# Patient Record
Sex: FEMALE | Race: WHITE
[De-identification: names, ages, dates, MRNs, and addresses within clinical notes are randomized per-mention and may not be internally consistent; named-entity substitution may affect disease eponyms.]

---

## 2017-03-29 ENCOUNTER — APPOINTMENT (OUTPATIENT)
Dept: ORTHOPEDIC SURGERY | Facility: CLINIC | Age: 77
End: 2017-03-29

## 2017-03-29 VITALS — RESPIRATION RATE: 16 BRPM | WEIGHT: 175 LBS | BODY MASS INDEX: 31.01 KG/M2 | HEIGHT: 63 IN

## 2017-03-29 DIAGNOSIS — Z78.9 OTHER SPECIFIED HEALTH STATUS: ICD-10-CM

## 2017-03-29 DIAGNOSIS — M19.042 PRIMARY OSTEOARTHRITIS, LEFT HAND: ICD-10-CM

## 2017-03-29 PROBLEM — Z00.00 ENCOUNTER FOR PREVENTIVE HEALTH EXAMINATION: Status: ACTIVE | Noted: 2017-03-29

## 2017-03-29 RX ORDER — CHROMIUM 200 MCG
1000 TABLET ORAL
Refills: 0 | Status: ACTIVE | COMMUNITY

## 2017-03-29 RX ORDER — MELOXICAM 15 MG/1
15 TABLET ORAL
Qty: 14 | Refills: 0 | Status: ACTIVE | COMMUNITY
Start: 2016-12-05

## 2017-03-29 RX ORDER — LEVOTHYROXINE SODIUM 137 UG/1
TABLET ORAL
Refills: 0 | Status: ACTIVE | COMMUNITY

## 2017-03-29 RX ORDER — TAMOXIFEN CITRATE 20 MG/1
20 TABLET, FILM COATED ORAL
Qty: 90 | Refills: 0 | Status: ACTIVE | COMMUNITY
Start: 2016-06-06

## 2017-03-29 RX ORDER — LISINOPRIL 20 MG/1
20 TABLET ORAL
Refills: 0 | Status: ACTIVE | COMMUNITY

## 2017-03-29 RX ORDER — AMOXICILLIN 250 MG/1
250 CAPSULE ORAL
Qty: 30 | Refills: 0 | Status: DISCONTINUED | COMMUNITY
Start: 2017-02-01

## 2017-03-29 RX ORDER — AMLODIPINE BESYLATE 5 MG/1
5 TABLET ORAL
Refills: 0 | Status: ACTIVE | COMMUNITY

## 2017-11-30 ENCOUNTER — APPOINTMENT (OUTPATIENT)
Dept: ORTHOPEDIC SURGERY | Facility: CLINIC | Age: 77
End: 2017-11-30
Payer: MEDICARE

## 2017-11-30 VITALS — RESPIRATION RATE: 16 BRPM | BODY MASS INDEX: 31.01 KG/M2 | WEIGHT: 175 LBS | HEIGHT: 63 IN

## 2017-11-30 DIAGNOSIS — M65.332 TRIGGER FINGER, LEFT MIDDLE FINGER: ICD-10-CM

## 2017-11-30 PROCEDURE — 99214 OFFICE O/P EST MOD 30 MIN: CPT | Mod: 25

## 2017-11-30 PROCEDURE — 20550 NJX 1 TENDON SHEATH/LIGAMENT: CPT | Mod: F2

## 2019-04-08 ENCOUNTER — APPOINTMENT (OUTPATIENT)
Dept: OTOLARYNGOLOGY | Facility: CLINIC | Age: 79
End: 2019-04-08
Payer: MEDICARE

## 2019-04-08 VITALS
HEART RATE: 70 BPM | OXYGEN SATURATION: 99 % | DIASTOLIC BLOOD PRESSURE: 75 MMHG | TEMPERATURE: 98 F | RESPIRATION RATE: 18 BRPM | SYSTOLIC BLOOD PRESSURE: 178 MMHG

## 2019-04-08 DIAGNOSIS — Z82.3 FAMILY HISTORY OF STROKE: ICD-10-CM

## 2019-04-08 DIAGNOSIS — H91.90 UNSPECIFIED HEARING LOSS, UNSPECIFIED EAR: ICD-10-CM

## 2019-04-08 DIAGNOSIS — Z78.9 OTHER SPECIFIED HEALTH STATUS: ICD-10-CM

## 2019-04-08 DIAGNOSIS — Z82.49 FAMILY HISTORY OF ISCHEMIC HEART DISEASE AND OTHER DISEASES OF THE CIRCULATORY SYSTEM: ICD-10-CM

## 2019-04-08 DIAGNOSIS — Z86.79 PERSONAL HISTORY OF OTHER DISEASES OF THE CIRCULATORY SYSTEM: ICD-10-CM

## 2019-04-08 DIAGNOSIS — H72.03 CENTRAL PERFORATION OF TYMPANIC MEMBRANE, BILATERAL: ICD-10-CM

## 2019-04-08 DIAGNOSIS — Z86.39 PERSONAL HISTORY OF OTHER ENDOCRINE, NUTRITIONAL AND METABOLIC DISEASE: ICD-10-CM

## 2019-04-08 PROCEDURE — 99203 OFFICE O/P NEW LOW 30 MIN: CPT | Mod: 25

## 2019-04-08 PROCEDURE — 92557 COMPREHENSIVE HEARING TEST: CPT

## 2019-04-08 PROCEDURE — 69210 REMOVE IMPACTED EAR WAX UNI: CPT

## 2019-04-08 PROCEDURE — 92550 TYMPANOMETRY & REFLEX THRESH: CPT

## 2019-04-08 NOTE — HISTORY OF PRESENT ILLNESS
[de-identified] : 78 yo woman here with her . She has had bilateral progressive hearing loss over many years. Has h/o b recurrent yamile for which she has had b pe tubes by Dr Waters and the left is known to be extruded. She says she has a tm perf in that ear. She is c/o r > l hl and fullness to a moderate extent does no use qtips no pain or drainage. She wants to see what can be done for her hearing. no fh relevant to cc

## 2019-04-08 NOTE — PROCEDURE
[Cerumen Impaction] : Cerumen Impaction [Same] : same as the Pre Op Dx. [] : Removal of Cerumen [FreeTextEntry6] : r copouis cerumen imapction rmeoved atrauamtially with suciton

## 2019-10-14 ENCOUNTER — APPOINTMENT (OUTPATIENT)
Dept: OTOLARYNGOLOGY | Facility: CLINIC | Age: 79
End: 2019-10-14
Payer: MEDICARE

## 2019-10-14 VITALS
DIASTOLIC BLOOD PRESSURE: 77 MMHG | SYSTOLIC BLOOD PRESSURE: 157 MMHG | HEART RATE: 52 BPM | TEMPERATURE: 98.1 F | OXYGEN SATURATION: 100 %

## 2019-10-14 PROCEDURE — 99214 OFFICE O/P EST MOD 30 MIN: CPT | Mod: 25

## 2019-10-14 PROCEDURE — 69433 CREATE EARDRUM OPENING: CPT | Mod: RT

## 2019-10-14 PROCEDURE — 69210 REMOVE IMPACTED EAR WAX UNI: CPT | Mod: 59

## 2019-10-14 PROCEDURE — 92557 COMPREHENSIVE HEARING TEST: CPT

## 2019-10-14 PROCEDURE — 92550 TYMPANOMETRY & REFLEX THRESH: CPT

## 2019-10-14 RX ORDER — OFLOXACIN OTIC 3 MG/ML
0.3 SOLUTION AURICULAR (OTIC) TWICE DAILY
Qty: 1 | Refills: 3 | Status: ACTIVE | COMMUNITY
Start: 2019-10-14 | End: 1900-01-01

## 2019-10-14 NOTE — HISTORY OF PRESENT ILLNESS
[de-identified] : 78 yo woman with longstanding h/o b snhl and need for recurrent pe tubes has noticed over the past 6 mo that her r ear is getting progressively blocked. she feels it is severely blocked at this point. She denies pain or drainage. she has h/o pe tubes in the past with Dr Waters. she is a [employer] user.

## 2019-10-14 NOTE — PHYSICAL EXAM
[de-identified] : r yamile; b copious cerumen imaopction rmoeved with suction [Normal] : no rashes

## 2019-10-14 NOTE — PROCEDURE
[Risk and Benefits Discussed] : The purpose, risks, discomforts, benefits and alternatives of the procedure have been explained to the patient including no treatment. [Same] : same as the Pre Op Dx. [Cerumen Impaction] : Cerumen Impaction [] : Removal of Cerumen [FreeTextEntry6] : b cerumen imapction rmeoved atrauamgtially with suiciotn\par r yamile - discussed risks beneifts andalts to r m and t wished to proceed done

## 2019-10-25 ENCOUNTER — APPOINTMENT (OUTPATIENT)
Dept: OTOLARYNGOLOGY | Facility: CLINIC | Age: 79
End: 2019-10-25
Payer: MEDICARE

## 2019-10-25 VITALS
SYSTOLIC BLOOD PRESSURE: 156 MMHG | OXYGEN SATURATION: 99 % | RESPIRATION RATE: 15 BRPM | TEMPERATURE: 98.4 F | HEART RATE: 57 BPM | DIASTOLIC BLOOD PRESSURE: 70 MMHG

## 2019-10-25 DIAGNOSIS — H90.3 SENSORINEURAL HEARING LOSS, BILATERAL: ICD-10-CM

## 2019-10-25 PROCEDURE — 99212 OFFICE O/P EST SF 10 MIN: CPT

## 2019-10-26 PROBLEM — H90.3 SENSORY HEARING LOSS, BILATERAL: Status: ACTIVE | Noted: 2019-04-08

## 2019-10-26 NOTE — HISTORY OF PRESENT ILLNESS
[de-identified] : followup 78 yo woman with r chronic serous otitis had pe tube placed in rf ear the week before last and feels much better./ she had used the drops prescribed for a week after. here to check her ears, accompanied by her .

## 2019-10-26 NOTE — PHYSICAL EXAM
[Normal] : tympanic membranes are normal in both ears [de-identified] : r pe tube clean and dry, in place

## 2020-01-24 ENCOUNTER — APPOINTMENT (OUTPATIENT)
Dept: OTOLARYNGOLOGY | Facility: CLINIC | Age: 80
End: 2020-01-24
Payer: MEDICARE

## 2020-01-24 VITALS
TEMPERATURE: 97.8 F | OXYGEN SATURATION: 98 % | SYSTOLIC BLOOD PRESSURE: 184 MMHG | DIASTOLIC BLOOD PRESSURE: 84 MMHG | HEART RATE: 62 BPM

## 2020-01-24 PROCEDURE — 69433 CREATE EARDRUM OPENING: CPT | Mod: RT

## 2020-01-24 PROCEDURE — 99213 OFFICE O/P EST LOW 20 MIN: CPT | Mod: 25

## 2020-01-24 RX ORDER — OFLOXACIN OTIC 3 MG/ML
0.3 SOLUTION AURICULAR (OTIC) TWICE DAILY
Qty: 1 | Refills: 3 | Status: ACTIVE | COMMUNITY
Start: 2020-01-24 | End: 1900-01-01

## 2020-01-24 NOTE — PROCEDURE
[Risk and Benefits Discussed] : The purpose, risks, discomforts, benefits and alternatives of the procedure have been explained to the patient including no treatment. [Same] : same as the Pre Op Dx. [] : M & T [FreeTextEntry4] : phenol [FreeTextEntry6] : r pe tube placed atraumatically under microscope

## 2020-01-24 NOTE — HISTORY OF PRESENT ILLNESS
[de-identified] : follow up 80 yo woman with h/o r recurrent yamile and r pe tube feels hl in r ear for a few weeks. she feels her pe tube may have fallen out. no uri pain or drainage. she feels her r hl is severe. here with her .nonsmoker.

## 2020-01-24 NOTE — PHYSICAL EXAM
[de-identified] : yuniel wills rmeoved pe tube is out tm healed and r yamile [Normal] : mucosa is normal

## 2020-02-07 ENCOUNTER — APPOINTMENT (OUTPATIENT)
Dept: OTOLARYNGOLOGY | Facility: CLINIC | Age: 80
End: 2020-02-07
Payer: MEDICARE

## 2020-02-07 VITALS
HEART RATE: 65 BPM | DIASTOLIC BLOOD PRESSURE: 66 MMHG | TEMPERATURE: 98.1 F | OXYGEN SATURATION: 97 % | SYSTOLIC BLOOD PRESSURE: 171 MMHG

## 2020-02-07 PROCEDURE — 99213 OFFICE O/P EST LOW 20 MIN: CPT

## 2020-02-07 RX ORDER — MOMETASONE FUROATE 1 MG/G
0.1 CREAM TOPICAL
Qty: 1 | Refills: 3 | Status: ACTIVE | COMMUNITY
Start: 2020-02-07 | End: 1900-01-01

## 2020-02-08 RX ORDER — ERGOCALCIFEROL 1.25 MG/1
1.25 MG CAPSULE, LIQUID FILLED ORAL
Qty: 4 | Refills: 0 | Status: ACTIVE | COMMUNITY
Start: 2020-01-06

## 2020-02-08 NOTE — PHYSICAL EXAM
[de-identified] : scan r cerumen removed with curet, else clear [Normal] : external appearance is normal

## 2020-02-08 NOTE — HISTORY OF PRESENT ILLNESS
[de-identified] : followup 78 yo woman s/p r pe tube placement 2 weeks ago feels some fullness in the r ear and is here to check it. the fullness is mild. denies pain or drainage. -fsc.

## 2020-09-28 ENCOUNTER — APPOINTMENT (OUTPATIENT)
Dept: OTOLARYNGOLOGY | Facility: CLINIC | Age: 80
End: 2020-09-28
Payer: MEDICARE

## 2020-09-28 VITALS
SYSTOLIC BLOOD PRESSURE: 169 MMHG | TEMPERATURE: 98.4 F | DIASTOLIC BLOOD PRESSURE: 75 MMHG | OXYGEN SATURATION: 99 % | HEART RATE: 70 BPM

## 2020-09-28 DIAGNOSIS — H93.8X1 OTHER SPECIFIED DISORDERS OF RIGHT EAR: ICD-10-CM

## 2020-09-28 DIAGNOSIS — H61.23 IMPACTED CERUMEN, BILATERAL: ICD-10-CM

## 2020-09-28 PROCEDURE — 99212 OFFICE O/P EST SF 10 MIN: CPT

## 2020-09-28 NOTE — HISTORY OF PRESENT ILLNESS
[de-identified] : 80F who returns with R ear fullness and decreased hearing for several weeks. She denies any tinnitus, otalgia, otorrhea, vertigo. No other ENT complaints. Has pe tube in r ear and she is unsure as to whether it is blocked.

## 2020-09-28 NOTE — PHYSICAL EXAM
[Normal] : tympanic membranes are normal in both ears [de-identified] : bilateral EAC with cerumen impaction removed atraumaticaly with suction [de-identified] : R PE tube in place, clean and dry

## 2020-09-28 NOTE — ASSESSMENT
[FreeTextEntry1] : 80F who returns with right ear fullness, found to have bilateral cerumen impaction. Once the cerumen was removed, she had immediate relief. pe tube in good pos'n, clean and dry\par \par reassured\par rtc 6 mo or as needed

## 2021-02-17 ENCOUNTER — APPOINTMENT (OUTPATIENT)
Dept: OTOLARYNGOLOGY | Facility: CLINIC | Age: 81
End: 2021-02-17
Payer: MEDICARE

## 2021-02-17 VITALS
HEART RATE: 71 BPM | OXYGEN SATURATION: 98 % | DIASTOLIC BLOOD PRESSURE: 65 MMHG | TEMPERATURE: 97.9 F | SYSTOLIC BLOOD PRESSURE: 181 MMHG

## 2021-02-17 DIAGNOSIS — H90.A31 MIXED CONDUCTIVE AND SENSORINEURAL HEARING LOSS, UNILATERAL, RIGHT EAR WITH RESTRICTED HEARING ON THE  CONTRALATERAL SIDE: ICD-10-CM

## 2021-02-17 DIAGNOSIS — H65.21 CHRONIC SEROUS OTITIS MEDIA, RIGHT EAR: ICD-10-CM

## 2021-02-17 PROCEDURE — 69433 CREATE EARDRUM OPENING: CPT | Mod: RT

## 2021-02-17 PROCEDURE — 92557 COMPREHENSIVE HEARING TEST: CPT

## 2021-02-17 PROCEDURE — 92550 TYMPANOMETRY & REFLEX THRESH: CPT

## 2021-02-17 PROCEDURE — 99213 OFFICE O/P EST LOW 20 MIN: CPT | Mod: 25

## 2021-02-17 RX ORDER — OFLOXACIN OTIC 3 MG/ML
0.3 SOLUTION AURICULAR (OTIC)
Qty: 1 | Refills: 1 | Status: ACTIVE | COMMUNITY
Start: 2021-02-17 | End: 1900-01-01

## 2021-02-17 NOTE — PHYSICAL EXAM
[Normal] : no rashes [de-identified] : b mild cerumen removed with suction, r yamile. tube in canal, removed with alligator forceps

## 2021-02-17 NOTE — PROCEDURE
[Risk and Benefits Discussed] : The purpose, risks, discomforts, benefits and alternatives of the procedure have been explained to the patient including no treatment. [Same] : same as the Pre Op Dx. [] : M & T [FreeTextEntry4] : phenol [FreeTextEntry6] : r pe tube placed atraumatically under microscope- copious yellow serous fluid suctioned from myringotomy

## 2021-02-17 NOTE — REASON FOR VISIT
[Subsequent Evaluation] : a subsequent evaluation for [FreeTextEntry2] : hearing loss, r blocked ear

## 2021-02-17 NOTE — ASSESSMENT
[FreeTextEntry1] : mild cerumen removed\par r mhl with recurrent yamile\par discussed risks beneits and alts to new r pe tube\par she wished to proceed\par done and she felt better\par dep\par drops\par rtc 3 mo and as needed, call for problems

## 2021-02-17 NOTE — HISTORY OF PRESENT ILLNESS
[de-identified] : 81 yo woman with b progressive hl and h/o recurrent r yamile has had pe tubes in r ear, last was 13 mo ago. She reports r significant decrease in hearing over the past 2 months. No inciting factor. No uri or ear infxs has not gotten ear wet. Has b known snhl but prefers no hearing aids. Here with her .

## 2021-06-09 ENCOUNTER — APPOINTMENT (OUTPATIENT)
Dept: OTOLARYNGOLOGY | Facility: CLINIC | Age: 81
End: 2021-06-09
Payer: MEDICARE

## 2021-06-09 VITALS
WEIGHT: 175 LBS | DIASTOLIC BLOOD PRESSURE: 74 MMHG | HEIGHT: 63 IN | HEART RATE: 61 BPM | BODY MASS INDEX: 31.01 KG/M2 | TEMPERATURE: 98.1 F | OXYGEN SATURATION: 98 % | SYSTOLIC BLOOD PRESSURE: 168 MMHG

## 2021-06-09 PROCEDURE — 92550 TYMPANOMETRY & REFLEX THRESH: CPT

## 2021-06-09 PROCEDURE — 99213 OFFICE O/P EST LOW 20 MIN: CPT | Mod: 25

## 2021-06-09 PROCEDURE — 92557 COMPREHENSIVE HEARING TEST: CPT

## 2021-06-09 PROCEDURE — 69433 CREATE EARDRUM OPENING: CPT | Mod: 50

## 2021-06-09 RX ORDER — AZITHROMYCIN 250 MG/1
250 TABLET, FILM COATED ORAL
Qty: 6 | Refills: 0 | Status: ACTIVE | COMMUNITY
Start: 2020-12-31

## 2021-06-09 RX ORDER — OFLOXACIN OTIC 3 MG/ML
0.3 SOLUTION AURICULAR (OTIC) TWICE DAILY
Qty: 1 | Refills: 0 | Status: ACTIVE | COMMUNITY
Start: 2021-06-09 | End: 1900-01-01

## 2021-06-09 NOTE — PHYSICAL EXAM
[de-identified] : b yamile - r pe tube in canal l tm healed. [Normal] : no rashes [de-identified] : gait steady

## 2021-06-09 NOTE — HISTORY OF PRESENT ILLNESS
[de-identified] : followup 82 yo woman with h/o r recurrent serous otitis media and left tm perf. She c/o b aural fullness and hl for a few weeks. Denies pain or drainage. Her b hl is severe.

## 2021-06-09 NOTE — ASSESSMENT
[FreeTextEntry1] : b serous otitis media\par discussed risks benefits and alts to b myringotomy and pe tube placement\par she wished to proceed\par she then left office to get something to eat\par I saw her when she returned (but had to see those already waiting first)\par procedure done\par b copious fluid sucitoned\par hearing improved\par floxin 1 week\par dep\par rtc 3 mo to check; asked to call sooner for any problems

## 2021-06-09 NOTE — PROCEDURE
[Risk and Benefits Discussed] : The purpose, risks, discomforts, benefits and alternatives of the procedure have been explained to the patient including no treatment. [Same] : same as the Pre Op Dx. [] : M & T [FreeTextEntry4] : phenol [FreeTextEntry6] : b myringotomy and pe tube performed without complication under microscope

## 2022-02-08 ENCOUNTER — APPOINTMENT (OUTPATIENT)
Dept: OTOLARYNGOLOGY | Facility: CLINIC | Age: 82
End: 2022-02-08
Payer: MEDICARE

## 2022-02-08 VITALS
OXYGEN SATURATION: 98 % | SYSTOLIC BLOOD PRESSURE: 190 MMHG | HEART RATE: 66 BPM | DIASTOLIC BLOOD PRESSURE: 80 MMHG | BODY MASS INDEX: 28.35 KG/M2 | RESPIRATION RATE: 17 BRPM | WEIGHT: 160 LBS | TEMPERATURE: 98 F | HEIGHT: 63 IN

## 2022-02-08 DIAGNOSIS — H90.6 MIXED CONDUCTIVE AND SENSORINEURAL HEARING LOSS, BILATERAL: ICD-10-CM

## 2022-02-08 DIAGNOSIS — H65.23 CHRONIC SEROUS OTITIS MEDIA, BILATERAL: ICD-10-CM

## 2022-02-08 DIAGNOSIS — H65.21 CHRONIC SEROUS OTITIS MEDIA, RIGHT EAR: ICD-10-CM

## 2022-02-08 DIAGNOSIS — H65.22 CHRONIC SEROUS OTITIS MEDIA, LEFT EAR: ICD-10-CM

## 2022-02-08 PROCEDURE — 92557 COMPREHENSIVE HEARING TEST: CPT

## 2022-02-08 PROCEDURE — 69420 INCISION OF EARDRUM: CPT | Mod: LT

## 2022-02-08 PROCEDURE — 92550 TYMPANOMETRY & REFLEX THRESH: CPT

## 2022-02-08 PROCEDURE — 99213 OFFICE O/P EST LOW 20 MIN: CPT | Mod: 25

## 2022-02-08 PROCEDURE — 69433 CREATE EARDRUM OPENING: CPT | Mod: RT

## 2022-02-08 RX ORDER — CIPROFLOXACIN AND DEXAMETHASONE 3; 1 MG/ML; MG/ML
0.3-0.1 SUSPENSION/ DROPS AURICULAR (OTIC)
Qty: 1 | Refills: 0 | Status: DISCONTINUED | COMMUNITY
Start: 2022-02-08 | End: 2022-02-08

## 2022-02-08 RX ORDER — OFLOXACIN OTIC 3 MG/ML
0.3 SOLUTION AURICULAR (OTIC) TWICE DAILY
Qty: 28 | Refills: 1 | Status: ACTIVE | COMMUNITY
Start: 2022-02-08 | End: 1900-01-01

## 2022-02-08 NOTE — ASSESSMENT
[FreeTextEntry1] : 1. b chronic serous otitis media\par - showed R mild to severe mixed hl, L mild to mod snhl, b B tymps- results reviewed with pt. Discussed meds vs pe tube replacement - she wished to proceed with b attempt at myringotomy and pe tube with myringotomy alone to be performed if she cannot tolerate on either side. risks benefits and alts discussed and she agreed.\par -R PE tube placed atraumatically\par -L myringotomy, pt unable to tolerate PE placement on L, suctioned fluid atraumatically\par -ofloxacin- I explained ciprodex are first line- she understands this but she states it is too expensive as she does not have insurance for it\par dep\par RTC in 10 d with new

## 2022-02-08 NOTE — REASON FOR VISIT
[Subsequent Evaluation] : a subsequent evaluation for [FreeTextEntry2] : b hl and recurrent serous otitis media

## 2022-02-08 NOTE — DATA REVIEWED
[de-identified] :  showed R mild to severe mixed hl, L mild to mod snhl, b B tymps- results reviewed with pt; gaps hav sometimes been larger but they are present

## 2022-02-08 NOTE — HISTORY OF PRESENT ILLNESS
[de-identified] : 6 month followup for this 82 y/o F with h/o b recurrent serous otitis media. She feels her ears are blocked r>l denies pain or drainage. Ear blocked for 2 weeks. -f/s/c

## 2022-02-08 NOTE — PROCEDURE
[Risk and Benefits Discussed] : The purpose, risks, discomforts, benefits and alternatives of the procedure have been explained to the patient including no treatment. [Same] : same as the Pre Op Dx. [] : M & T [FreeTextEntry4] : Phenol [FreeTextEntry6] : R PE tube placed atraumatically, L myringotomy, pt unable to tolerate PE placement on L she has b stenotic eac's; procedures done under microscope

## 2022-02-08 NOTE — PHYSICAL EXAM
[Normal] : tympanic membranes are normal in both ears [de-identified] : b yamile - r pe tube in canal removed atraumatically under microsocpe with hook [de-identified] : gait steady

## 2022-02-23 ENCOUNTER — APPOINTMENT (OUTPATIENT)
Dept: OTOLARYNGOLOGY | Facility: CLINIC | Age: 82
End: 2022-02-23

## 2022-04-06 ENCOUNTER — RESULT REVIEW (OUTPATIENT)
Age: 82
End: 2022-04-06

## 2022-05-25 ENCOUNTER — RESULT REVIEW (OUTPATIENT)
Age: 82
End: 2022-05-25

## 2022-07-27 ENCOUNTER — APPOINTMENT (OUTPATIENT)
Dept: ORTHOPEDIC SURGERY | Facility: CLINIC | Age: 82
End: 2022-07-27

## 2022-07-27 VITALS — RESPIRATION RATE: 16 BRPM | WEIGHT: 160 LBS | BODY MASS INDEX: 28.35 KG/M2 | HEIGHT: 63 IN

## 2022-07-27 DIAGNOSIS — R20.2 ANESTHESIA OF SKIN: ICD-10-CM

## 2022-07-27 DIAGNOSIS — R20.0 ANESTHESIA OF SKIN: ICD-10-CM

## 2022-07-27 PROCEDURE — 99203 OFFICE O/P NEW LOW 30 MIN: CPT

## 2022-07-27 PROCEDURE — 73110 X-RAY EXAM OF WRIST: CPT | Mod: 50

## 2024-10-24 ENCOUNTER — APPOINTMENT (OUTPATIENT)
Dept: OBGYN | Facility: CLINIC | Age: 84
End: 2024-10-24

## 2024-10-24 ENCOUNTER — NON-APPOINTMENT (OUTPATIENT)
Age: 84
End: 2024-10-24

## 2024-10-24 VITALS
SYSTOLIC BLOOD PRESSURE: 149 MMHG | BODY MASS INDEX: 28.88 KG/M2 | OXYGEN SATURATION: 98 % | HEART RATE: 68 BPM | DIASTOLIC BLOOD PRESSURE: 79 MMHG | HEIGHT: 63 IN | WEIGHT: 163 LBS

## 2024-10-24 PROCEDURE — 99204 OFFICE O/P NEW MOD 45 MIN: CPT

## 2024-10-24 RX ORDER — ROSUVASTATIN CALCIUM 10 MG/1
10 TABLET, FILM COATED ORAL
Refills: 0 | Status: ACTIVE | COMMUNITY

## 2024-11-07 NOTE — ASU PATIENT PROFILE, ADULT - FALL HARM RISK - UNIVERSAL INTERVENTIONS
Bed in lowest position, wheels locked, appropriate side rails in place/Call bell, personal items and telephone in reach/Instruct patient to call for assistance before getting out of bed or chair/Non-slip footwear when patient is out of bed/[place] to call system/Physically safe environment - no spills, clutter or unnecessary equipment/Purposeful Proactive Rounding/Room/bathroom lighting operational, light cord in reach

## 2024-11-07 NOTE — ASU PATIENT PROFILE, ADULT - NSICDXPASTMEDICALHX_GEN_ALL_CORE_FT
PAST MEDICAL HISTORY:  Breast CA     DVT, lower extremity 1996    H/O thyroid nodule     HTN (hypertension)

## 2024-11-08 ENCOUNTER — APPOINTMENT (OUTPATIENT)
Dept: OBGYN | Facility: AMBULATORY SURGERY CENTER | Age: 84
End: 2024-11-08

## 2024-11-08 ENCOUNTER — RESULT REVIEW (OUTPATIENT)
Age: 84
End: 2024-11-08

## 2024-11-08 ENCOUNTER — TRANSCRIPTION ENCOUNTER (OUTPATIENT)
Age: 84
End: 2024-11-08

## 2024-11-08 ENCOUNTER — OUTPATIENT (OUTPATIENT)
Dept: OUTPATIENT SERVICES | Facility: HOSPITAL | Age: 84
LOS: 1 days | Discharge: HOME | End: 2024-11-08
Payer: MEDICARE

## 2024-11-08 VITALS
SYSTOLIC BLOOD PRESSURE: 194 MMHG | DIASTOLIC BLOOD PRESSURE: 73 MMHG | TEMPERATURE: 98 F | HEART RATE: 86 BPM | WEIGHT: 160.72 LBS | OXYGEN SATURATION: 97 % | HEIGHT: 63 IN | RESPIRATION RATE: 16 BRPM

## 2024-11-08 VITALS
HEART RATE: 79 BPM | TEMPERATURE: 97 F | SYSTOLIC BLOOD PRESSURE: 140 MMHG | OXYGEN SATURATION: 96 % | DIASTOLIC BLOOD PRESSURE: 62 MMHG | RESPIRATION RATE: 14 BRPM

## 2024-11-08 DIAGNOSIS — Z96.653 PRESENCE OF ARTIFICIAL KNEE JOINT, BILATERAL: Chronic | ICD-10-CM

## 2024-11-08 DIAGNOSIS — Z98.890 OTHER SPECIFIED POSTPROCEDURAL STATES: Chronic | ICD-10-CM

## 2024-11-08 PROCEDURE — 88305 TISSUE EXAM BY PATHOLOGIST: CPT | Mod: 26

## 2024-11-08 PROCEDURE — 58558 HYSTEROSCOPY BIOPSY: CPT

## 2024-11-08 DEVICE — AVETA FLEX RESECTING DEVICE 2.9MM: Type: IMPLANTABLE DEVICE | Status: FUNCTIONAL

## 2024-11-08 RX ORDER — LISINOPRIL 40 MG
1 TABLET ORAL
Refills: 0 | DISCHARGE

## 2024-11-08 RX ORDER — ASPIRIN/MAG CARB/ALUMINUM AMIN 325 MG
1 TABLET ORAL
Refills: 0 | DISCHARGE

## 2024-11-08 RX ORDER — ACETAMINOPHEN 500 MG
650 TABLET ORAL ONCE
Refills: 0 | Status: DISCONTINUED | OUTPATIENT
Start: 2024-11-08 | End: 2024-11-08

## 2024-11-08 RX ORDER — AMLODIPINE BESYLATE 10 MG
1 TABLET ORAL
Refills: 0 | DISCHARGE

## 2024-11-08 RX ORDER — TAMOXIFEN CITRATE 20 MG/1
1 TABLET, FILM COATED ORAL
Refills: 0 | DISCHARGE

## 2024-11-08 RX ORDER — ONDANSETRON HYDROCHLORIDE 2 MG/ML
4 INJECTION, SOLUTION INTRAMUSCULAR; INTRAVENOUS ONCE
Refills: 0 | Status: DISCONTINUED | OUTPATIENT
Start: 2024-11-08 | End: 2024-11-08

## 2024-11-08 RX ORDER — ACETAMINOPHEN 500 MG
1000 TABLET ORAL ONCE
Refills: 0 | Status: DISCONTINUED | OUTPATIENT
Start: 2024-11-08 | End: 2024-11-08

## 2024-11-08 RX ORDER — LEVOTHYROXINE SODIUM 88 MCG
1 TABLET ORAL
Refills: 0 | DISCHARGE

## 2024-11-08 NOTE — ASU DISCHARGE PLAN (ADULT/PEDIATRIC) - FINANCIAL ASSISTANCE
[facility] provides services at a reduced cost to those who are determined to be eligible through [facility]’s financial assistance program. Information regarding [facility]’s financial assistance program can be found by going to [email or web address] or by calling [phone].

## 2024-11-08 NOTE — ASU DISCHARGE PLAN (ADULT/PEDIATRIC) - NS MD DC FALL RISK RISK
For information on Fall & Injury Prevention, visit: [email or web address] OR  [email or web address] OR  https://www.cdc.gov/steadi/patient.html

## 2024-11-08 NOTE — PRE-ANESTHESIA EVALUATION ADULT - NSANTHOSAYNRD_GEN_A_CORE
No. SMITH screening performed.  STOP BANG Legend: 0-2 = LOW Risk; 3-4 = INTERMEDIATE Risk; 5-8 = HIGH Risk

## 2024-11-08 NOTE — BRIEF OPERATIVE NOTE - OPERATION/FINDINGS
The cervix was visualized and the anterior lip of the cervix was grasped with a tenaculum. A large cervical polyp was protruding form the os and removed with polyp forceps. The Aveta hysteroscope was then introduced easily into the cervical canal and into the uterus. Intrauterine cavity was noted to have loose filmy tissue throughout. The resection device was advanced into the uterus and used to clear the tissue. The cavity was visualized in entirety and normal appearing after removal of tissue. The tenaculum was removed from the cervical os. No active bleeding was noted from the anterior lip of the cervix or the cervical os on completion of the procedure. The vagina was cleaned using sterile gauze on ring forceps.  The cervix was visualized and the anterior lip of the cervix was grasped with a tenaculum. A large cervical polyp was protruding form the os and removed with polyp forceps. The Aveta hysteroscope was then introduced easily into the cervical canal and into the uterus. Intrauterine cavity was noted to have loose filmy tissue throughout. The resection device was advanced into the uterus and used to clear the tissue. The cavity was visualized in entirety and normal appearing after removal of tissue. Fluid deficit was 30 cc. The tenaculum was removed from the cervical os. No active bleeding was noted from the anterior lip of the cervix or the cervical os on completion of the procedure. The vagina was cleaned using sterile gauze on ring forceps.

## 2024-11-08 NOTE — ASU DISCHARGE PLAN (ADULT/PEDIATRIC) - CARE PROVIDER_API CALL
Osvaldo Tse  Obstetrics and Gynecology  [address] [address]  Phone: [phone]  Fax: [phone]  Follow Up Time:

## 2024-11-12 ENCOUNTER — NON-APPOINTMENT (OUTPATIENT)
Age: 84
End: 2024-11-12

## 2024-11-21 ENCOUNTER — APPOINTMENT (OUTPATIENT)
Dept: OBGYN | Facility: CLINIC | Age: 84
End: 2024-11-21

## 2024-11-21 VITALS
HEIGHT: 63 IN | BODY MASS INDEX: 28.7 KG/M2 | HEART RATE: 86 BPM | DIASTOLIC BLOOD PRESSURE: 61 MMHG | WEIGHT: 162 LBS | OXYGEN SATURATION: 97 % | SYSTOLIC BLOOD PRESSURE: 162 MMHG

## 2024-11-21 PROCEDURE — 99213 OFFICE O/P EST LOW 20 MIN: CPT

## (undated) DEVICE — FLUENT FMS PROCEDURE KIT

## (undated) DEVICE — AVETA CORAL HYSTEROSCOPE 4.6MM DISP

## (undated) DEVICE — MYOSURE SCOPE SEAL

## (undated) DEVICE — GLV 7 PROTEXIS (WHITE)

## (undated) DEVICE — SOL IRR BAG NS 0.9% 3000ML

## (undated) DEVICE — WARMING BLANKET UPPER ADULT

## (undated) DEVICE — PREP BETADINE KIT

## (undated) DEVICE — VENODYNE/SCD SLEEVE CALF MEDIUM

## (undated) DEVICE — PACK D&C